# Patient Record
Sex: FEMALE
[De-identification: names, ages, dates, MRNs, and addresses within clinical notes are randomized per-mention and may not be internally consistent; named-entity substitution may affect disease eponyms.]

---

## 2020-03-30 ENCOUNTER — HOSPITAL ENCOUNTER (OUTPATIENT)
Dept: HOSPITAL 53 - M LABNEURO | Age: 25
End: 2020-03-30
Attending: PSYCHIATRY & NEUROLOGY
Payer: COMMERCIAL

## 2020-03-30 DIAGNOSIS — R51: Primary | ICD-10-CM

## 2020-03-30 LAB
25(OH)D3 SERPL-MCNC: 15.6 NG/ML (ref 30–100)
ALBUMIN SERPL BCG-MCNC: 4.3 GM/DL (ref 3.2–5.2)
ALT SERPL W P-5'-P-CCNC: 15 U/L (ref 12–78)
BASOPHILS # BLD AUTO: 0 10^3/UL (ref 0–0.2)
BASOPHILS NFR BLD AUTO: 0.2 % (ref 0–1)
BILIRUB SERPL-MCNC: 0.2 MG/DL (ref 0.2–1)
BUN SERPL-MCNC: 9 MG/DL (ref 7–18)
CALCIUM SERPL-MCNC: 9.3 MG/DL (ref 8.5–10.1)
CHLORIDE SERPL-SCNC: 108 MEQ/L (ref 98–107)
CO2 SERPL-SCNC: 31 MEQ/L (ref 21–32)
CREAT SERPL-MCNC: 0.74 MG/DL (ref 0.55–1.3)
EOSINOPHIL # BLD AUTO: 0.1 10^3/UL (ref 0–0.5)
EOSINOPHIL NFR BLD AUTO: 1.3 % (ref 0–3)
ERYTHROCYTE [SEDIMENTATION RATE] IN BLOOD BY WESTERGREN METHOD: 2 MM/HR (ref 0–20)
GFR SERPL CREATININE-BSD FRML MDRD: > 60 ML/MIN/{1.73_M2} (ref 60–?)
GLUCOSE SERPL-MCNC: 80 MG/DL (ref 70–100)
HCT VFR BLD AUTO: 44.6 % (ref 36–47)
HGB BLD-MCNC: 14.8 G/DL (ref 12–15.5)
LYMPHOCYTES # BLD AUTO: 2.3 10^3/UL (ref 1.5–5)
LYMPHOCYTES NFR BLD AUTO: 42.3 % (ref 24–44)
MCH RBC QN AUTO: 30.6 PG (ref 27–33)
MCHC RBC AUTO-ENTMCNC: 33.2 G/DL (ref 32–36.5)
MCV RBC AUTO: 92.1 FL (ref 80–96)
MONOCYTES # BLD AUTO: 0.5 10^3/UL (ref 0–0.8)
MONOCYTES NFR BLD AUTO: 8.7 % (ref 0–5)
NEUTROPHILS # BLD AUTO: 2.6 10^3/UL (ref 1.5–8.5)
NEUTROPHILS NFR BLD AUTO: 47.3 % (ref 36–66)
PLATELET # BLD AUTO: 199 10^3/UL (ref 150–450)
POTASSIUM SERPL-SCNC: 4.5 MEQ/L (ref 3.5–5.1)
PROT SERPL-MCNC: 7.5 GM/DL (ref 6.4–8.2)
RBC # BLD AUTO: 4.84 10^6/UL (ref 4–5.4)
RHEUMATOID FACT SERPL-ACNC: < 10 IU/ML (ref ?–15)
SODIUM SERPL-SCNC: 141 MEQ/L (ref 136–145)
TSH SERPL DL<=0.005 MIU/L-ACNC: 4.04 UIU/ML (ref 0.36–3.74)
WBC # BLD AUTO: 5.5 10^3/UL (ref 4–10)

## 2020-12-19 ENCOUNTER — HOSPITAL ENCOUNTER (EMERGENCY)
Dept: HOSPITAL 53 - M ED | Age: 25
Discharge: HOME | End: 2020-12-19
Payer: COMMERCIAL

## 2020-12-19 VITALS — BODY MASS INDEX: 20.76 KG/M2 | WEIGHT: 129.19 LBS | HEIGHT: 66 IN

## 2020-12-19 VITALS — SYSTOLIC BLOOD PRESSURE: 106 MMHG | DIASTOLIC BLOOD PRESSURE: 74 MMHG

## 2020-12-19 DIAGNOSIS — Z79.899: ICD-10-CM

## 2020-12-19 DIAGNOSIS — K66.1: Primary | ICD-10-CM

## 2020-12-19 DIAGNOSIS — K58.9: ICD-10-CM

## 2020-12-19 DIAGNOSIS — Z88.8: ICD-10-CM

## 2020-12-19 LAB
ALBUMIN SERPL BCG-MCNC: 4.6 GM/DL (ref 3.2–5.2)
ALT SERPL W P-5'-P-CCNC: 14 U/L (ref 12–78)
B-HCG SERPL QL: NEGATIVE
BASOPHILS # BLD AUTO: 0 10^3/UL (ref 0–0.2)
BASOPHILS NFR BLD AUTO: 0.1 % (ref 0–1)
BILIRUB CONJ SERPL-MCNC: 0.1 MG/DL (ref 0–0.2)
BILIRUB SERPL-MCNC: 0.3 MG/DL (ref 0.2–1)
BUN SERPL-MCNC: 11 MG/DL (ref 7–18)
CALCIUM SERPL-MCNC: 9.5 MG/DL (ref 8.5–10.1)
CHLAMYDIA DNA AMPLIFICATION: NEGATIVE
CHLORIDE SERPL-SCNC: 107 MEQ/L (ref 98–107)
CO2 SERPL-SCNC: 29 MEQ/L (ref 21–32)
CREAT SERPL-MCNC: 0.81 MG/DL (ref 0.55–1.3)
EOSINOPHIL # BLD AUTO: 0 10^3/UL (ref 0–0.5)
EOSINOPHIL NFR BLD AUTO: 0.3 % (ref 0–3)
GFR SERPL CREATININE-BSD FRML MDRD: > 60 ML/MIN/{1.73_M2} (ref 60–?)
GLUCOSE SERPL-MCNC: 78 MG/DL (ref 70–100)
HCT VFR BLD AUTO: 43.4 % (ref 36–47)
HGB BLD-MCNC: 14 G/DL (ref 12–15.5)
LIPASE SERPL-CCNC: 71 U/L (ref 73–393)
LYMPHOCYTES # BLD AUTO: 2.6 10^3/UL (ref 1.5–5)
LYMPHOCYTES NFR BLD AUTO: 26 % (ref 24–44)
MCH RBC QN AUTO: 29.8 PG (ref 27–33)
MCHC RBC AUTO-ENTMCNC: 32.3 G/DL (ref 32–36.5)
MCV RBC AUTO: 92.3 FL (ref 80–96)
MONOCYTES # BLD AUTO: 0.5 10^3/UL (ref 0–0.8)
MONOCYTES NFR BLD AUTO: 4.6 % (ref 0–5)
N GONORRHOEA RRNA SPEC QL NAA+PROBE: NEGATIVE
NEUTROPHILS # BLD AUTO: 6.9 10^3/UL (ref 1.5–8.5)
NEUTROPHILS NFR BLD AUTO: 68.7 % (ref 36–66)
PLATELET # BLD AUTO: 201 10^3/UL (ref 150–450)
POTASSIUM SERPL-SCNC: 3.7 MEQ/L (ref 3.5–5.1)
PROT SERPL-MCNC: 7.6 GM/DL (ref 6.4–8.2)
RBC # BLD AUTO: 4.7 10^6/UL (ref 4–5.4)
SODIUM SERPL-SCNC: 142 MEQ/L (ref 136–145)
WBC # BLD AUTO: 10.1 10^3/UL (ref 4–10)

## 2020-12-19 PROCEDURE — 96361 HYDRATE IV INFUSION ADD-ON: CPT

## 2020-12-19 PROCEDURE — 85025 COMPLETE CBC W/AUTO DIFF WBC: CPT

## 2020-12-19 PROCEDURE — 74177 CT ABD & PELVIS W/CONTRAST: CPT

## 2020-12-19 PROCEDURE — 80048 BASIC METABOLIC PNL TOTAL CA: CPT

## 2020-12-19 PROCEDURE — 87210 SMEAR WET MOUNT SALINE/INK: CPT

## 2020-12-19 PROCEDURE — 76856 US EXAM PELVIC COMPLETE: CPT

## 2020-12-19 PROCEDURE — 80076 HEPATIC FUNCTION PANEL: CPT

## 2020-12-19 PROCEDURE — 99284 EMERGENCY DEPT VISIT MOD MDM: CPT

## 2020-12-19 PROCEDURE — 96374 THER/PROPH/DIAG INJ IV PUSH: CPT

## 2020-12-19 PROCEDURE — 83690 ASSAY OF LIPASE: CPT

## 2020-12-19 PROCEDURE — 84703 CHORIONIC GONADOTROPIN ASSAY: CPT

## 2020-12-19 PROCEDURE — 81001 URINALYSIS AUTO W/SCOPE: CPT

## 2020-12-19 PROCEDURE — 87661 TRICHOMONAS VAGINALIS AMPLIF: CPT

## 2020-12-19 NOTE — REP
INDICATION:

pelvic pain, numbness, diff urinating, hx ov teratoma



COMPARISON:

None.



TECHNIQUE:

Transabdominal pelvic ultrasound followed by transvaginal examination for better

evaluation of the endometrium and adnexa with color Doppler evaluation of the ovaries.



FINDINGS:

Bladder is collapsed.



Heterogeneous retroverted uterus measures 6.5 x 3.5 x 4.3 cm.  Endometrial complex

measures 7.1 mm thickness.  No discrete uterine or endometrial abnormality noted.



Bilateral ovaries are normal in vascularity without evidence for torsion.  Right ovary

measures 2.9 x 2.1 x 1.8 cm; R I = 0.50.  Left ovary measures 5.4 x 3.6 x 3.5 cm with

3.1 x 2.8 x 2.5 cm complex likely hemorrhagic physiologic cyst; R I = 0.48.



Moderate amount of free fluid in the pelvis is nonspecific.



IMPRESSION:

1.  Complex left ovarian cyst possibly hemorrhagic physiologic cyst along with

moderate amount of free fluid in the pelvis.





<Electronically signed by Claudio De Los Santos > 12/19/20 [id number]

## 2020-12-19 NOTE — REPVR
PROCEDURE INFORMATION: 

Exam: CT Abdomen And Pelvis With Contrast 

Exam date and time: 12/19/2020 5:55 PM 

Age: 24 years old 

Clinical indication: Pain and abnormal findings; Abnormal radiologic finding of 

the abdomen; Radiologic exam and body structure: US; Abdominal pain; Localized; 

Lower; Additional info: Mod ff pelvis, pelvic pain, ruq abd pain, urinary 

symptoms 



TECHNIQUE: 

Imaging protocol: Computed tomography of the abdomen and pelvis with 

intravenous contrast. 

Radiation optimization: All CT scans at this facility use at least one of these 

dose optimization techniques: automated exposure control; mA and/or kV 

adjustment per patient size (includes targeted exams where dose is matched to 

clinical indication); or iterative reconstruction. 

Contrast material: ISOVUE 370; Contrast volume: 100 ml; Contrast route: 

INTRAVENOUS (IV);  



COMPARISON: 

US PELVIC NON-OB COMPLETE 12/19/2020 4:45 PM 



FINDINGS: 

Lungs: Included lung bases are clear. 



Liver: Normal. No mass. 

Gallbladder and bile ducts: Normal. No calcified stones. No ductal dilation. 

Pancreas: Normal. No ductal dilation. 

Spleen: Normal. No splenomegaly. 

Adrenal glands: Normal. No mass. 

Kidneys and ureters: Normal. No hydronephrosis. 

Stomach and bowel: No bowel dilatation to indicate obstruction. No pneumatosis. 

Appendix: No evidence of appendicitis. 



Intraperitoneal space: Slightly radiodense small volume free fluid in the 

pelvis with small amount of more dense material layering dependently. No free 

air. 

Vasculature: Unremarkable. No abdominal aortic aneurysm. 

Lymph nodes: Unremarkable. No enlarged lymph nodes. 

Urinary bladder: Unremarkable as visualized. 

Reproductive: There is a 3.3 x 2.7 x 2.8 cm hypodense lesion in the left 

adnexa, shown to be within the left ovary on ultrasound. This has Hounsfield 

units of 15, less dense than typically expected for a hemorrhagic cyst, with 

findings suggestive of hemorrhagic cyst on ultrasound. Adjacent small amount of 

fluid in the pelvis is more dense than simple fluid, with Hounsfield units of 

31. This could be from a component of blood products, although infected 

material or proteinaceous content could also contribute to this appearance. 

Ovarian parenchyma bilaterally is relatively obscured by this slightly dense 

fluid. There is slightly more dense material along the posterior margin which 

is small in volume which may represent blood clot with Hounsfield units of 73. 

Bones/joints: Unremarkable. No acute fracture. 

Soft tissues:  Unremarkable.



IMPRESSION: 

1. 3.3 cm partially collapsed cyst in the left ovary, less dense than typically 

expected for a hemorrhagic cyst (ultrasound appearance suggestive of 

hemorrhagic cyst).  Small amount of slightly radiodense free fluid in the 

pelvis, with some material with higher density layering posteriorly. Overall, 

this constellation of findings could represent an atypical hypodense left 

ovarian hemorrhagic cyst which is partially collapsed with adjacent small 

volume hemoperitoneum, although tubo-ovarian abscess with small volume infected 

fluid is also possible.  Suggest correlation with patient symptoms and 

presentation, and consider follow-up cross-sectional imaging if the patient's 

symptoms persist or worsen.



Electronically signed by: Dorota Lakhani On 12/19/2020  19:21:33 PM

## 2020-12-22 NOTE — ED PDOC
Post-Departure Follow-Up


[facility] faxed formal report of ct abd/p for fu mlg Lundborg-Gray,Maja MD          Dec 22, 2020 17:44

## 2021-08-19 ENCOUNTER — HOSPITAL ENCOUNTER (EMERGENCY)
Dept: HOSPITAL 53 - M ED | Age: 26
Discharge: HOME | End: 2021-08-19
Payer: COMMERCIAL

## 2021-08-19 VITALS — DIASTOLIC BLOOD PRESSURE: 73 MMHG | SYSTOLIC BLOOD PRESSURE: 110 MMHG

## 2021-08-19 VITALS — BODY MASS INDEX: 23.95 KG/M2 | HEIGHT: 66 IN | WEIGHT: 149.03 LBS

## 2021-08-19 DIAGNOSIS — K21.9: ICD-10-CM

## 2021-08-19 DIAGNOSIS — Z79.899: ICD-10-CM

## 2021-08-19 DIAGNOSIS — R11.0: ICD-10-CM

## 2021-08-19 DIAGNOSIS — K58.8: ICD-10-CM

## 2021-08-19 DIAGNOSIS — I45.19: ICD-10-CM

## 2021-08-19 DIAGNOSIS — R10.2: ICD-10-CM

## 2021-08-19 DIAGNOSIS — F17.290: ICD-10-CM

## 2021-08-19 DIAGNOSIS — M50.30: ICD-10-CM

## 2021-08-19 DIAGNOSIS — G43.909: ICD-10-CM

## 2021-08-19 DIAGNOSIS — F41.9: ICD-10-CM

## 2021-08-19 DIAGNOSIS — N83.291: Primary | ICD-10-CM

## 2021-08-19 DIAGNOSIS — F32.9: ICD-10-CM

## 2021-08-19 LAB
ALBUMIN SERPL BCG-MCNC: 4.2 GM/DL (ref 3.2–5.2)
ALT SERPL W P-5'-P-CCNC: 25 U/L (ref 12–78)
BASOPHILS # BLD AUTO: 0 10^3/UL (ref 0–0.2)
BASOPHILS NFR BLD AUTO: 0.2 % (ref 0–1)
BILIRUB CONJ SERPL-MCNC: < 0.1 MG/DL (ref 0–0.2)
BILIRUB SERPL-MCNC: 0.3 MG/DL (ref 0.2–1)
CK MB CFR.DF SERPL CALC: 1.45
CK MB SERPL-MCNC: < 1 NG/ML (ref ?–3.6)
CK SERPL-CCNC: 69 U/L (ref 26–192)
EOSINOPHIL # BLD AUTO: 0.1 10^3/UL (ref 0–0.5)
EOSINOPHIL NFR BLD AUTO: 0.6 % (ref 0–3)
HCT VFR BLD AUTO: 44.8 % (ref 36–47)
HGB BLD-MCNC: 15.2 G/DL (ref 12–15.5)
LIPASE SERPL-CCNC: 66 U/L (ref 73–393)
LYMPHOCYTES # BLD AUTO: 2.1 10^3/UL (ref 1.5–5)
LYMPHOCYTES NFR BLD AUTO: 23.4 % (ref 24–44)
MCH RBC QN AUTO: 30.6 PG (ref 27–33)
MCHC RBC AUTO-ENTMCNC: 33.9 G/DL (ref 32–36.5)
MCV RBC AUTO: 90.3 FL (ref 80–96)
MONOCYTES # BLD AUTO: 0.6 10^3/UL (ref 0–0.8)
MONOCYTES NFR BLD AUTO: 7.3 % (ref 2–8)
NEUTROPHILS # BLD AUTO: 6 10^3/UL (ref 1.5–8.5)
NEUTROPHILS NFR BLD AUTO: 68.4 % (ref 36–66)
PLATELET # BLD AUTO: 212 10^3/UL (ref 150–450)
PROT SERPL-MCNC: 7.8 GM/DL (ref 6.4–8.2)
RBC # BLD AUTO: 4.96 10^6/UL (ref 4–5.4)
TROPONIN I SERPL-MCNC: < 0.02 NG/ML (ref ?–0.1)
WBC # BLD AUTO: 8.8 10^3/UL (ref 4–10)

## 2021-08-19 PROCEDURE — 82550 ASSAY OF CK (CPK): CPT

## 2021-08-19 PROCEDURE — 99284 EMERGENCY DEPT VISIT MOD MDM: CPT

## 2021-08-19 PROCEDURE — 96375 TX/PRO/DX INJ NEW DRUG ADDON: CPT

## 2021-08-19 PROCEDURE — 81001 URINALYSIS AUTO W/SCOPE: CPT

## 2021-08-19 PROCEDURE — 76856 US EXAM PELVIC COMPLETE: CPT

## 2021-08-19 PROCEDURE — 76830 TRANSVAGINAL US NON-OB: CPT

## 2021-08-19 PROCEDURE — 84484 ASSAY OF TROPONIN QUANT: CPT

## 2021-08-19 PROCEDURE — 93976 VASCULAR STUDY: CPT

## 2021-08-19 PROCEDURE — 80076 HEPATIC FUNCTION PANEL: CPT

## 2021-08-19 PROCEDURE — 85025 COMPLETE CBC W/AUTO DIFF WBC: CPT

## 2021-08-19 PROCEDURE — 80047 BASIC METABLC PNL IONIZED CA: CPT

## 2021-08-19 PROCEDURE — 84702 CHORIONIC GONADOTROPIN TEST: CPT

## 2021-08-19 PROCEDURE — 96374 THER/PROPH/DIAG INJ IV PUSH: CPT

## 2021-08-19 PROCEDURE — 93005 ELECTROCARDIOGRAM TRACING: CPT

## 2021-08-19 PROCEDURE — 83690 ASSAY OF LIPASE: CPT

## 2021-08-19 PROCEDURE — 82553 CREATINE MB FRACTION: CPT

## 2021-08-19 PROCEDURE — 93041 RHYTHM ECG TRACING: CPT

## 2021-08-20 NOTE — ECGEPIP
[facility] - ED

                                       

                                       Test Date:    2021

Pat Name:     SANIYA LYONS             Department:   

Patient ID:   [id number]                 Room:         -

Gender:       Female                   Technician:   

:          [id number]               Requested By: CASSY IYER PA-C

Order Number: [id number]     Reading MD:   Amanda Whitfield

                                 Measurements

Intervals                              Axis          

Rate:         66                       P:            34

MI:           140                      QRS:          21

QRSD:         96                       T:            30

QT:           376                                    

QTc:          394                                    

                           Interpretive Statements

Normal sinus rhythm

Incomplete right bundle branch block

No prior

Electronically Signed on 2021 10:16:11 EDT by Amanda Whitfield

## 2021-08-23 NOTE — ED PDOC
Post-Departure Follow-Up


pelvic us faxed to [facility] for fu mlg Lundborg-Gray,Maja MD          Aug 23, 2021 12:56

## 2022-04-06 ENCOUNTER — HOSPITAL ENCOUNTER (OUTPATIENT)
Dept: HOSPITAL 53 - M RAD | Age: 27
End: 2022-04-06
Attending: NURSE PRACTITIONER
Payer: COMMERCIAL

## 2022-04-06 DIAGNOSIS — M25.562: Primary | ICD-10-CM

## 2024-09-27 NOTE — REP
INDICATION:

lower abd/pelvic pain, h/o teratoma and ovarian cysts.



COMPARISON:

12/19/2020.



TECHNIQUE:

Transabdominal and transvaginal scanning performed.



FINDINGS:

Uterine dimensions are  6.5 x 3.4 x 3.5 cm.  Endometrial echo is 9 mm in AP dimension

and centrally placed. The uterus is retroverted.

The bladder is empty.



The right ovary has dimensions of 5.0 x 2.7 x 3.3 cm.  It's Doppler flow is normal

with a resistive index of 0.44.



The left ovary dimensions are 3.1 x 1.6 x 2.2 cm.  It's Doppler flow was normal with

resistive index of 0.34.



There is a complex septated cystic structure in the right ovary which may represent a

small hemorrhagic cyst, 3.6 x 2.6 x 2.6 cm.



There is mild free fluid in the adnexal regions.





IMPRESSION:

Complex right ovarian cyst 3.6 cm.  No torsion.  Mild free fluid in the adnexal

regions.





<Electronically signed by Xander Loo > 08/19/21 1932 no